# Patient Record
Sex: FEMALE | Race: WHITE | NOT HISPANIC OR LATINO | URBAN - METROPOLITAN AREA
[De-identification: names, ages, dates, MRNs, and addresses within clinical notes are randomized per-mention and may not be internally consistent; named-entity substitution may affect disease eponyms.]

---

## 2023-08-23 ENCOUNTER — OFFICE VISIT (OUTPATIENT)
Age: 23
End: 2023-08-23
Payer: COMMERCIAL

## 2023-08-23 VITALS
BODY MASS INDEX: 38.57 KG/M2 | WEIGHT: 240 LBS | RESPIRATION RATE: 18 BRPM | SYSTOLIC BLOOD PRESSURE: 144 MMHG | OXYGEN SATURATION: 97 % | HEART RATE: 98 BPM | TEMPERATURE: 98 F | HEIGHT: 66 IN | DIASTOLIC BLOOD PRESSURE: 101 MMHG

## 2023-08-23 DIAGNOSIS — B30.9 ACUTE VIRAL CONJUNCTIVITIS OF RIGHT EYE: Primary | ICD-10-CM

## 2023-08-23 DIAGNOSIS — R03.0 ELEVATED BLOOD PRESSURE READING IN OFFICE WITHOUT DIAGNOSIS OF HYPERTENSION: ICD-10-CM

## 2023-08-23 PROCEDURE — 99203 OFFICE O/P NEW LOW 30 MIN: CPT | Performed by: EMERGENCY MEDICINE

## 2023-08-23 RX ORDER — LORATADINE 10 MG/1
10 TABLET ORAL DAILY
COMMUNITY

## 2023-08-23 RX ORDER — POLYMYXIN B SULFATE AND TRIMETHOPRIM 1; 10000 MG/ML; [USP'U]/ML
1 SOLUTION OPHTHALMIC EVERY 4 HOURS
Qty: 10 ML | Refills: 0 | Status: SHIPPED | OUTPATIENT
Start: 2023-08-23

## 2023-08-23 NOTE — PROGRESS NOTES
[facility]        NAME: Amanda Calderon is a 26 y.o. female  : 2000    MRN: [id number]  DATE: 2023  TIME: 10:00 AM    Assessment and Plan   Acute viral conjunctivitis of right eye [B30.9]  1. Acute viral conjunctivitis of right eye  polymyxin b-trimethoprim (POLYTRIM) ophthalmic solution      2. Elevated blood pressure reading in office without diagnosis of hypertension              Patient Instructions     Patient Instructions     You have been diagnosed with conjunctivitis, which may be viral, bacterial, allergic or chemical and there is no test available in an urgent care setting can be used to tell the difference. Statistically 95% of the cases are viral.  The viruses that cause conjunctivitis often are the same viruses that cause viral upper respiratory infections. Although your conjunctivitis today is likely viral we do give antibiotic drops as a precaution. Avoid Visine or any similar vasoconstrictor drops as this will interfere with your body's ability to fight this infection. Use only the antibiotic drops as prescribed but you may also use cool compresses to the eyelids or an eye cup with cold saline to reduce redness or swelling. You may also take an anti-inflammatory like Advil or Aleve for the redness and swelling. Take Zinc 50 mg every 12 hours for the next week. You should also take vitamin D3 5000 i.u.s per day for the next 1 week, and vitamin-C 1 g daily. Conjunctivitis   AMBULATORY CARE:   Conjunctivitis , or pink eye, is inflammation of your conjunctiva. The conjunctiva is a thin tissue that covers the front of your eye and the back of your eyelids. The conjunctiva helps protect your eye and keep it moist. Conjunctivitis may be caused by bacteria, allergies, or a virus. If your conjunctivitis is caused by bacteria, it may get better on its own in about 7 days. Viral conjunctivitis can last up to 3 weeks. Common symptoms may include any of the following:   You will usually have symptoms in both eyes if your conjunctivitis is caused by allergies. You may also have other allergic symptoms, such as a rash or runny nose. Symptoms will usually start in 1 eye if your conjunctivitis is caused by a virus or bacteria. • Redness in the whites of your eye    • Itching in your eye or around your eye    • Feeling like there is something in your eye    • Watery or thick, sticky discharge    • Crusty eyelids when you wake up in the morning    • Burning, stinging, or swelling in your eye    • Pain when you see bright light    Seek care immediately if:   • You have worsening eye pain. • The swelling in your eye gets worse, even after treatment. • Your vision suddenly becomes worse or you cannot see at all. Call your doctor if:   • You develop a fever and ear pain. • You have tiny bumps or spots of blood on your eye. • You have questions or concerns about your condition or care. Treatment  will depend on the cause of your conjunctivitis. You may need antibiotics or allergy medicine as a pill, eye drop, or eye ointment. Manage your symptoms:   • Apply a cool compress. Wet a washcloth with cold water and place it on your eye. This will help decrease itching and irritation. • Do not wear contact lenses. They can irritate your eye. Throw away the pair you are using and ask when you can wear them again. Use a new pair of lenses when your provider says it is okay. • Avoid irritants. Stay away from smoke filled areas. Shield your eyes from wind and sun. • Flush your eye. You may need to flush your eye with saline to help decrease your symptoms. Ask for more information on how to flush your eye. Medicines:  Treatment depends on what is causing your conjunctivitis. You may be given any of the following:  • Allergy medicine  helps decrease itchy, red, swollen eyes caused by allergies. It may be given as a pill, eye drops, or nasal spray.     • Antibiotics  may be needed if your conjunctivitis is caused by bacteria. This medicine may be given as a pill, eye drops, or eye ointment. • Take your medicine as directed. Contact your healthcare provider if you think your medicine is not helping or if you have side effects. Tell your provider if you are allergic to any medicine. Keep a list of the medicines, vitamins, and herbs you take. Include the amounts, and when and why you take them. Bring the list or the pill bottles to follow-up visits. Carry your medicine list with you in case of an emergency. Prevent the spread of conjunctivitis:   • Wash your hands with soap and water often. Wash your hands before and after you touch your eyes. Also wash your hands before you prepare or eat food and after you use the bathroom or change a diaper. • Avoid allergens. Try to avoid the things that cause your allergies, such as pets, dust, or grass. • Avoid contact with others. Do not share towels or washcloths. Try to stay away from others as much as possible. Ask when you can return to work or school. • Throw away eye makeup. The bacteria that caused your conjunctivitis can stay in eye makeup. Throw away your current mascara and other eye makeup. Never share mascara or other eye makeup with anyone. Follow up with your doctor as directed:  Write down your questions so you remember to ask them during your visits. © Copyright Vanna [employer] 2022 Information is for End User's use only and may not be sold, redistributed or otherwise used for commercial purposes. The above information is an  only. It is not intended as medical advice for individual conditions or treatments. Talk to your doctor, nurse or pharmacist before following any medical regimen to see if it is safe and effective for you. Follow up with PCP in 3-5 days. Proceed to  ER if symptoms worsen.     Chief Complaint     Chief Complaint   Patient presents with   • Cold Like Symptoms Right eye drainage, stuffy nose and sore throat since Monday          History of Present Illness       Complains of pinkeye on the right since 2 days ago. Review of Systems   Review of Systems   Constitutional: Negative for chills and fever. HENT: Negative for congestion. Eyes: Positive for discharge, redness and itching. Negative for photophobia, pain and visual disturbance. Respiratory: Negative for cough. Current Medications       Current Outpatient Medications:   •  etonogestrel (NEXPLANON) subdermal implant, Inject under the skin, Disp: , Rfl:   •  loratadine (CLARITIN) 10 mg tablet, Take 10 mg by mouth daily, Disp: , Rfl:   •  polymyxin b-trimethoprim (POLYTRIM) ophthalmic solution, Administer 1 drop to the right eye every 4 (four) hours, Disp: 10 mL, Rfl: 0    Current Allergies     Allergies as of 08/23/2023 - Reviewed 08/23/2023   Allergen Reaction Noted   • Cat hair extract Hives 01/25/2023            The following portions of the patient's history were reviewed and updated as appropriate: allergies, current medications, past family history, past medical history, past social history, past surgical history and problem list.     History reviewed. No pertinent past medical history. History reviewed. No pertinent surgical history. Family History   Problem Relation Age of Onset   • No Known Problems Mother    • No Known Problems Father          Medications have been verified. Objective   BP (!) 144/101   Pulse 98   Temp 98 °F (36.7 °C) (Tympanic)   Resp 18   Ht 5' 6" (1.676 m)   Wt 109 kg (240 lb)   SpO2 97%   BMI 38.74 kg/m²        Physical Exam     Physical Exam  Vitals and nursing note reviewed. Constitutional:       General: She is not in acute distress. Appearance: She is well-developed. Eyes:      General: No scleral icterus. Right eye: Discharge present. Left eye: No discharge. Pupils: Pupils are equal, round, and reactive to light. Cardiovascular:      Rate and Rhythm: Normal rate and regular rhythm. Pulmonary:      Effort: Pulmonary effort is normal.      Breath sounds: Normal breath sounds.

## 2023-08-23 NOTE — PATIENT INSTRUCTIONS
You have been diagnosed with conjunctivitis, which may be viral, bacterial, allergic or chemical and there is no test available in an urgent care setting can be used to tell the difference. Statistically 95% of the cases are viral.  The viruses that cause conjunctivitis often are the same viruses that cause viral upper respiratory infections. Although your conjunctivitis today is likely viral we do give antibiotic drops as a precaution. Avoid Visine or any similar vasoconstrictor drops as this will interfere with your body's ability to fight this infection. Use only the antibiotic drops as prescribed but you may also use cool compresses to the eyelids or an eye cup with cold saline to reduce redness or swelling. You may also take an anti-inflammatory like Advil or Aleve for the redness and swelling. Take Zinc 50 mg every 12 hours for the next week. You should also take vitamin D3 5000 i.u.s per day for the next 1 week, and vitamin-C 1 g daily. Conjunctivitis   AMBULATORY CARE:   Conjunctivitis , or pink eye, is inflammation of your conjunctiva. The conjunctiva is a thin tissue that covers the front of your eye and the back of your eyelids. The conjunctiva helps protect your eye and keep it moist. Conjunctivitis may be caused by bacteria, allergies, or a virus. If your conjunctivitis is caused by bacteria, it may get better on its own in about 7 days. Viral conjunctivitis can last up to 3 weeks. Common symptoms may include any of the following: You will usually have symptoms in both eyes if your conjunctivitis is caused by allergies. You may also have other allergic symptoms, such as a rash or runny nose. Symptoms will usually start in 1 eye if your conjunctivitis is caused by a virus or bacteria.   Redness in the whites of your eye    Itching in your eye or around your eye    Feeling like there is something in your eye    Watery or thick, sticky discharge    Crusty eyelids when you wake up in the morning    Burning, stinging, or swelling in your eye    Pain when you see bright light    Seek care immediately if:   You have worsening eye pain. The swelling in your eye gets worse, even after treatment. Your vision suddenly becomes worse or you cannot see at all. Call your doctor if:   You develop a fever and ear pain. You have tiny bumps or spots of blood on your eye. You have questions or concerns about your condition or care. Treatment  will depend on the cause of your conjunctivitis. You may need antibiotics or allergy medicine as a pill, eye drop, or eye ointment. Manage your symptoms:   Apply a cool compress. Wet a washcloth with cold water and place it on your eye. This will help decrease itching and irritation. Do not wear contact lenses. They can irritate your eye. Throw away the pair you are using and ask when you can wear them again. Use a new pair of lenses when your provider says it is okay. Avoid irritants. Stay away from smoke filled areas. Shield your eyes from wind and sun. Flush your eye. You may need to flush your eye with saline to help decrease your symptoms. Ask for more information on how to flush your eye. Medicines:  Treatment depends on what is causing your conjunctivitis. You may be given any of the following: Allergy medicine  helps decrease itchy, red, swollen eyes caused by allergies. It may be given as a pill, eye drops, or nasal spray. Antibiotics  may be needed if your conjunctivitis is caused by bacteria. This medicine may be given as a pill, eye drops, or eye ointment. Take your medicine as directed. Contact your healthcare provider if you think your medicine is not helping or if you have side effects. Tell your provider if you are allergic to any medicine. Keep a list of the medicines, vitamins, and herbs you take. Include the amounts, and when and why you take them. Bring the list or the pill bottles to follow-up visits.  Carry your medicine list with you in case of an emergency. Prevent the spread of conjunctivitis:   Wash your hands with soap and water often. Wash your hands before and after you touch your eyes. Also wash your hands before you prepare or eat food and after you use the bathroom or change a diaper. Avoid allergens. Try to avoid the things that cause your allergies, such as pets, dust, or grass. Avoid contact with others. Do not share towels or washcloths. Try to stay away from others as much as possible. Ask when you can return to work or school. Throw away eye makeup. The bacteria that caused your conjunctivitis can stay in eye makeup. Throw away your current mascara and other eye makeup. Never share mascara or other eye makeup with anyone. Follow up with your doctor as directed:  Write down your questions so you remember to ask them during your visits. © Copyright [facility] 2022 Information is for End User's use only and may not be sold, redistributed or otherwise used for commercial purposes. The above information is an  only. It is not intended as medical advice for individual conditions or treatments. Talk to your doctor, nurse or pharmacist before following any medical regimen to see if it is safe and effective for you. Prehypertension (Elevated Blood Pressure)   WHAT YOU NEED TO KNOW:   What is prehypertension? Prehypertension is a blood pressure level that is elevated, or slightly higher than normal. Blood pressure is the force of your blood pressing against the walls of your arteries. Normal blood pressure is 119/79 or lower. Prehypertension is 120/80 to 129/80. Prehypertension increases your risk for chronic (long-term) high blood pressure. Prehypertension and high blood pressure increase your risk for heart and blood vessel disease. Over time, this increases your risk for a life-threatening heart attack, stroke, heart failure, or kidney disease.        What increases my risk for prehypertension? Obesity or lack of exercise    Eating too much sodium (salt)    Low intake of fruits, vegetables, and other foods high in potassium    Use of tobacco, alcohol, or illegal drugs    A medical condition, such as diabetes, high cholesterol, or sleep apnea    Medicines, such as steroids or birth control pills    A family history of hypertension or heart disease    Age older than 54 years (men)    Age older than 72 years (women)    How is prehypertension diagnosed? Your healthcare provider will ask if you have a family history of high blood pressure. He or she will also ask about the medicines you take and any health conditions you have. He or she will check your blood pressure using a blood pressure cuff. Your healthcare provider may take more than one blood pressure reading, and take the average of these readings. How can I manage prehypertension? The goal is to lower your blood pressure into the normal range. Talk to your healthcare provider about these and other lifestyle changes you may need to make. Lifestyle changes can help prevent the need for medicine to lower your blood pressure. Check your blood pressure as directed. Your healthcare provider will tell you how often to check your blood pressure. Regular checks can help you monitor your blood pressure levels and make lifestyle changes if needed. Your provider may want you to keep a record of your blood pressure readings. Bring the record with you to follow-up appointments. Eat less sodium (salt). Do not add sodium to your food. Limit foods that are high in sodium, such as canned foods, potato chips, and cold cuts. Follow the meal plan recommended by your healthcare provider. Your healthcare provider may suggest that you follow the [address] This eating plan is low in sodium, unhealthy fats, and total fat. It is high in potassium, calcium, and fiber.          Exercise to maintain or reach a healthy weight. Exercise at least 30 minutes per day, on most days of the week. This will help decrease your blood pressure. Ask your healthcare provider about the best exercise plan for you. Decrease stress. This may help lower your blood pressure. Learn ways to relax, such as deep breathing or listening to music. Limit alcohol. Ask your healthcare provider if it is safe for you to drink alcohol. Women should limit alcohol to 1 drink a day. Men should limit alcohol to 2 drinks a day. A drink of alcohol is 12 ounces of beer, 5 ounces of wine, or 1½ ounces of liquor. Do not smoke. Nicotine and other chemicals in cigarettes and cigars can increase your blood pressure and cause lung damage. Ask your healthcare provider for information if you currently smoke and need help to quit. E-cigarettes or smokeless tobacco still contain nicotine. Talk to your healthcare provider before you use these products. Blood pressure medicine  may be needed if you cannot lower your blood pressure. Manage any other health conditions you have. Go to regular follow-up visits with your healthcare provider to manage these conditions. Call your local emergency number (911 in the [address]) if:   You have any of the following signs of a heart attack:      Squeezing, pressure, or pain in your chest    You may  also have any of the following:     Discomfort or pain in your back, neck, jaw, stomach, or arm    Shortness of breath    Nausea or vomiting    Lightheadedness or a sudden cold sweat    You have any of the following signs of a stroke:      Numbness or drooping on one side of your face     Weakness in an arm or leg    Confusion or difficulty speaking    Dizziness, a severe headache, or vision loss    When should I seek immediate care? You have a severe headache. You have sudden vision changes. When should I call my doctor?    You have been taking blood pressure medicine and your blood pressure is still higher than your healthcare provider says it should be. You have questions or concerns about your condition or care. CARE AGREEMENT:   You have the right to help plan your care. Learn about your health condition and how it may be treated. Discuss treatment options with your healthcare providers to decide what care you want to receive. You always have the right to refuse treatment. The above information is an  only. It is not intended as medical advice for individual conditions or treatments. Talk to your doctor, nurse or pharmacist before following any medical regimen to see if it is safe and effective for you. © Copyright [employer]et[employer] 2022 Information is for End User's use only and may not be sold, redistributed or otherwise used for commercial purposes.

## 2023-12-22 ENCOUNTER — OFFICE VISIT (OUTPATIENT)
Age: 23
End: 2023-12-22
Payer: COMMERCIAL

## 2023-12-22 VITALS
BODY MASS INDEX: 38.57 KG/M2 | TEMPERATURE: 96.8 F | HEART RATE: 98 BPM | OXYGEN SATURATION: 97 % | DIASTOLIC BLOOD PRESSURE: 88 MMHG | SYSTOLIC BLOOD PRESSURE: 132 MMHG | WEIGHT: 240 LBS | HEIGHT: 66 IN | RESPIRATION RATE: 17 BRPM

## 2023-12-22 DIAGNOSIS — U07.1 COVID-19: Primary | ICD-10-CM

## 2023-12-22 LAB
SARS-COV-2 AG UPPER RESP QL IA: POSITIVE
VALID CONTROL: ABNORMAL

## 2023-12-22 PROCEDURE — 87811 SARS-COV-2 COVID19 W/OPTIC: CPT | Performed by: EMERGENCY MEDICINE

## 2023-12-22 PROCEDURE — 99213 OFFICE O/P EST LOW 20 MIN: CPT | Performed by: EMERGENCY MEDICINE

## 2023-12-22 RX ORDER — PREDNISONE 10 MG/1
TABLET ORAL
Qty: 27 TABLET | Refills: 0 | Status: SHIPPED | OUTPATIENT
Start: 2023-12-22

## 2023-12-22 RX ORDER — BENZONATATE 200 MG/1
200 CAPSULE ORAL
Qty: 12 CAPSULE | Refills: 0 | Status: SHIPPED | OUTPATIENT
Start: 2023-12-22

## 2023-12-22 NOTE — PATIENT INSTRUCTIONS
You have been diagnosed with COVID-19, and your symptoms should resolve over the next 7 to 10 days with the treatments recommended today.  If they do not, it is possible that you have developed a bacterial infection and you should return. If you were to take an antibiotic while you are still in the viral stage, you will not get better any faster, but could kill off good germs in your body as well as make germs resistant to the antibiotic.  Take an expectorant - guaifenesin should be the only ingredient - during the day, and the cough suppressant (ex. Robitussin DM or Tessalon) if needed at night only.   Take Zinc 50 mg every 12 hours for the next week (the dose is important so do not just take a multivitamin with zinc or an over-the-counter cold med with zinc such as Airborne or Zicam, as that will not give you the sufficient dose).  You should also take Quercetin 500 mg twice daily with it (again dose is important).  You should also take vitamin D3 5000 i.u.s per day for the next 1 week, and vitamin-C 1 g twice daily (and again dosages are important, do not think you are getting enough vitamin C just by drinking extra orange juice).  May take Flonase as discussed.  You may also take a decongestant like Sudafed, unless you have hypertension or cardiac disease.  You may take Imodium for diarrhea according to package instructions.         COVID-19    COVID-19 Home Care Guidelines     Your healthcare provider and/or public health staff have evaluated you and have determined that you do not need to be hospitalized at this time.  At this time you can be isolated at home where you will be monitored by staff from your local or state health department. You should carefully follow the prevention and isolation steps below until a healthcare provider or local or state health department says that you can return to your normal activities.        Stay home except to get medical care     People who are mildly ill with COVID-19 are  able to isolate at home during their illness. You should restrict activities outside your home, except for getting medical care. Do not go to work, school, or public areas. Avoid using public transportation, ride-sharing, or taxis.     Separate yourself from other people and animals in your home     People: As much as possible, you should stay in a specific room and away from other people in your home. Also, you should use a separate bathroom, if available.  Animals: You should restrict contact with pets and other animals while you are sick with COVID-19, just like you would around other people. Although there have not been reports of pets or other animals becoming sick with COVID-19, it is still recommended that people sick with COVID-19 limit contact with animals until more information is known about the virus. When possible, have another member of your household care for your animals while you are sick. If you are sick with COVID-19, avoid contact with your pet, including petting, snuggling, being kissed or licked, and sharing food. If you must care for your pet or be around animals while you are sick, wash your hands before and after you interact with pets and wear a facemask. See COVID-19 and Animals for more information.     Call ahead before visiting your doctor     If you have a medical appointment, call the healthcare provider and tell them that you have or may have COVID-19. This will help the healthcare provider's office take steps to keep other people from getting infected or exposed.     Wear a facemask     You should wear a facemask when you are around other people (e.g., sharing a room or vehicle) or pets and before you enter a healthcare provider's office. If you are not able to wear a facemask (for example, because it causes trouble breathing), then people who live with you should not stay in the same room with you, or they should wear a facemask if they enter your room.     Cover your coughs and  sneezes     Cover your mouth and nose with a tissue when you cough or sneeze. Throw used tissues in a lined trash can. Immediately wash your hands with soap and water for at least 20 seconds or, if soap and water are not available, clean your hands with an alcohol-based hand  that contains at least 60% alcohol.     Clean your hands often     Wash your hands often with soap and water for at least 20 seconds, especially after blowing your nose, coughing, or sneezing; going to the bathroom; and before eating or preparing food. If soap and water are not readily available, use an alcohol-based hand  with at least 60% alcohol, covering all surfaces of your hands and rubbing them together until they feel dry.  Soap and water are the best option if hands are visibly dirty. Avoid touching your eyes, nose, and mouth with unwashed hands.     Avoid sharing personal household items     You should not share dishes, drinking glasses, cups, eating utensils, towels, or bedding with other people or pets in your home. After using these items, they should be washed thoroughly with soap and water.     Clean all “high-touch” surfaces everyday     High touch surfaces include counters, tabletops, doorknobs, bathroom fixtures, toilets, phones, keyboards, tablets, and bedside tables. Also, clean any surfaces that may have blood, stool, or body fluids on them. Use a household cleaning spray or wipe, according to the label instructions. Labels contain instructions for safe and effective use of the cleaning product including precautions you should take when applying the product, such as wearing gloves and making sure you have good ventilation during use of the product.     Monitor your symptoms     Seek prompt medical attention if your illness is worsening (e.g., difficulty breathing). Before seeking care, call your healthcare provider and tell them that you have, or are being evaluated for, COVID-19. Put on a facemask before  you enter the facility. These steps will help the healthcare provider's office to keep other people in the office or waiting room from getting infected or exposed. Ask your healthcare provider to call the local or state health department. Persons who are placed under active monitoring or facilitated self-monitoring should follow instructions provided by their local health department or occupational health professionals, as appropriate.  If you have a medical emergency and need to call 911, notify the dispatch personnel that you have, or are being evaluated for COVID-19. If possible, put on a facemask before emergency medical services arrive.     Discontinuing home isolation     Patients with confirmed COVID-19 should remain under home isolation precautions until the risk of secondary transmission to others is thought to be low. The decision to discontinue home isolation precautions should be made on a case-by-case basis, in consultation with healthcare providers and state and local health departments.     Source: https://www.cdc.gov/coronavirus/2019-ncov/hcp/guidance-prevent-spread.html        Proceed to ER if symptoms worsen

## 2023-12-22 NOTE — PROGRESS NOTES
[facility]        NAME: Tere Tello is a 23 y.o. female  : 2000    MRN: [id number]  DATE: 2023  TIME: 9:46 AM    Assessment and Plan   COVID-19 [U07.1]  1. COVID-19  Poct Covid 19 Rapid Antigen Test    predniSONE 10 mg tablet    benzonatate (TESSALON) 200 MG capsule            Patient Instructions     Patient Instructions   You have been diagnosed with COVID-19, and your symptoms should resolve over the next 7 to 10 days with the treatments recommended today.  If they do not, it is possible that you have developed a bacterial infection and you should return. If you were to take an antibiotic while you are still in the viral stage, you will not get better any faster, but could kill off good germs in your body as well as make germs resistant to the antibiotic.  Take an expectorant - guaifenesin should be the only ingredient - during the day, and the cough suppressant (ex. Robitussin DM or Tessalon) if needed at night only.   Take Zinc 50 mg every 12 hours for the next week (the dose is important so do not just take a multivitamin with zinc or an over-the-counter cold med with zinc such as Airborne or Zicam, as that will not give you the sufficient dose).  You should also take Quercetin 500 mg twice daily with it (again dose is important).  You should also take vitamin D3 5000 i.u.s per day for the next 1 week, and vitamin-C 1 g twice daily (and again dosages are important, do not think you are getting enough vitamin C just by drinking extra orange juice).  May take Flonase as discussed.  You may also take a decongestant like Sudafed, unless you have hypertension or cardiac disease.  You may take Imodium for diarrhea according to package instructions.         COVID-19    COVID-19 Home Care Guidelines     Your healthcare provider and/or public health staff have evaluated you and have determined that you do not need to be hospitalized at this time.  At this time you can be isolated at home  where you will be monitored by staff from your local or state health department. You should carefully follow the prevention and isolation steps below until a healthcare provider or local or state health department says that you can return to your normal activities.        Stay home except to get medical care     People who are mildly ill with COVID-19 are able to isolate at home during their illness. You should restrict activities outside your home, except for getting medical care. Do not go to work, school, or public areas. Avoid using public transportation, ride-sharing, or taxis.     Separate yourself from other people and animals in your home     People: As much as possible, you should stay in a specific room and away from other people in your home. Also, you should use a separate bathroom, if available.  Animals: You should restrict contact with pets and other animals while you are sick with COVID-19, just like you would around other people. Although there have not been reports of pets or other animals becoming sick with COVID-19, it is still recommended that people sick with COVID-19 limit contact with animals until more information is known about the virus. When possible, have another member of your household care for your animals while you are sick. If you are sick with COVID-19, avoid contact with your pet, including petting, snuggling, being kissed or licked, and sharing food. If you must care for your pet or be around animals while you are sick, wash your hands before and after you interact with pets and wear a facemask. See COVID-19 and Animals for more information.     Call ahead before visiting your doctor     If you have a medical appointment, call the healthcare provider and tell them that you have or may have COVID-19. This will help the healthcare provider's office take steps to keep other people from getting infected or exposed.     Wear a facemask     You should wear a facemask when you are  around other people (e.g., sharing a room or vehicle) or pets and before you enter a healthcare provider's office. If you are not able to wear a facemask (for example, because it causes trouble breathing), then people who live with you should not stay in the same room with you, or they should wear a facemask if they enter your room.     Cover your coughs and sneezes     Cover your mouth and nose with a tissue when you cough or sneeze. Throw used tissues in a lined trash can. Immediately wash your hands with soap and water for at least 20 seconds or, if soap and water are not available, clean your hands with an alcohol-based hand  that contains at least 60% alcohol.     Clean your hands often     Wash your hands often with soap and water for at least 20 seconds, especially after blowing your nose, coughing, or sneezing; going to the bathroom; and before eating or preparing food. If soap and water are not readily available, use an alcohol-based hand  with at least 60% alcohol, covering all surfaces of your hands and rubbing them together until they feel dry.  Soap and water are the best option if hands are visibly dirty. Avoid touching your eyes, nose, and mouth with unwashed hands.     Avoid sharing personal household items     You should not share dishes, drinking glasses, cups, eating utensils, towels, or bedding with other people or pets in your home. After using these items, they should be washed thoroughly with soap and water.     Clean all “high-touch” surfaces everyday     High touch surfaces include counters, tabletops, doorknobs, bathroom fixtures, toilets, phones, keyboards, tablets, and bedside tables. Also, clean any surfaces that may have blood, stool, or body fluids on them. Use a household cleaning spray or wipe, according to the label instructions. Labels contain instructions for safe and effective use of the cleaning product including precautions you should take when applying the  product, such as wearing gloves and making sure you have good ventilation during use of the product.     Monitor your symptoms     Seek prompt medical attention if your illness is worsening (e.g., difficulty breathing). Before seeking care, call your healthcare provider and tell them that you have, or are being evaluated for, COVID-19. Put on a facemask before you enter the facility. These steps will help the healthcare provider's office to keep other people in the office or waiting room from getting infected or exposed. Ask your healthcare provider to call the local or state health department. Persons who are placed under active monitoring or facilitated self-monitoring should follow instructions provided by their local health department or occupational health professionals, as appropriate.  If you have a medical emergency and need to call 911, notify the dispatch personnel that you have, or are being evaluated for COVID-19. If possible, put on a facemask before emergency medical services arrive.     Discontinuing home isolation     Patients with confirmed COVID-19 should remain under home isolation precautions until the risk of secondary transmission to others is thought to be low. The decision to discontinue home isolation precautions should be made on a case-by-case basis, in consultation with healthcare providers and [facility] and local health departments.     Source: https://www.cdc.gov/coronavirus/2019-ncov/hcp/guidance-prevent-spread.html        Proceed to ER if symptoms worsen          Follow up with PCP in 3-5 days.  Proceed to  ER if symptoms worsen.    Chief Complaint     Chief Complaint   Patient presents with    Cold Like Symptoms     Started x2 days ago - Productive cough w/ green mucus, irritated throat, nasal congestion. OTC - tylenol cold and flu. No home COVID test.          History of Present Illness       Patient complains of sore throat, cough, congestion for the past 2 days.        Review of  Systems   Review of Systems   Constitutional:  Negative for appetite change, chills, fatigue and fever.   HENT:  Positive for congestion, rhinorrhea, sinus pressure and sore throat. Negative for trouble swallowing and voice change.    Respiratory:  Positive for cough. Negative for chest tightness, shortness of breath and wheezing.    Cardiovascular:  Negative for chest pain.   Gastrointestinal:  Negative for nausea.   Musculoskeletal:  Negative for myalgias.         Current Medications       Current Outpatient Medications:     benzonatate (TESSALON) 200 MG capsule, Take 1 capsule (200 mg total) by mouth daily at bedtime as needed for cough, Disp: 12 capsule, Rfl: 0    etonogestrel (NEXPLANON) subdermal implant, Inject under the skin, Disp: , Rfl:     loratadine (CLARITIN) 10 mg tablet, Take 10 mg by mouth daily, Disp: , Rfl:     predniSONE 10 mg tablet, Take once daily all days pills on this schedule 6- 6- 5- 4- 3- 2- 1, Disp: 27 tablet, Rfl: 0    polymyxin b-trimethoprim (POLYTRIM) ophthalmic solution, Administer 1 drop to the right eye every 4 (four) hours (Patient not taking: Reported on 12/22/2023), Disp: 10 mL, Rfl: 0    Current Allergies     Allergies as of 12/22/2023 - Reviewed 12/22/2023   Allergen Reaction Noted    Cat hair extract Hives 01/25/2023            The following portions of the patient's history were reviewed and updated as appropriate: allergies, current medications, past family history, past medical history, past social history, past surgical history and problem list.     History reviewed. No pertinent past medical history.    History reviewed. No pertinent surgical history.    Family History   Problem Relation Age of Onset    No Known Problems Mother     No Known Problems Father          Medications have been verified.        Objective   /88 (BP Location: Right arm, Patient Position: Sitting, Cuff Size: Extra-Large)   Pulse 98   Temp (!) 96.8 °F (36 °C) (Tympanic)   Resp 17   Ht 5'  "6\" (1.676 m)   Wt 109 kg (240 lb)   LMP 12/18/2023   SpO2 97%   BMI 38.74 kg/m²        Physical Exam     Physical Exam  Vitals and nursing note reviewed.   Constitutional:       General: She is not in acute distress.     Appearance: She is well-developed. She is not ill-appearing or toxic-appearing.   HENT:      Head: Normocephalic and atraumatic.      Right Ear: External ear normal.      Left Ear: External ear normal.      Nose: Mucosal edema and congestion present.      Mouth/Throat:      Pharynx: Posterior oropharyngeal erythema present. No oropharyngeal exudate.      Tonsils: No tonsillar abscesses.   Cardiovascular:      Rate and Rhythm: Normal rate and regular rhythm.   Pulmonary:      Effort: Pulmonary effort is normal. No respiratory distress.      Breath sounds: No wheezing or rales.   Musculoskeletal:      Cervical back: Neck supple.   Skin:     General: Skin is warm and dry.   Neurological:      Mental Status: She is alert and oriented to person, place, and time.   Psychiatric:         Mood and Affect: Mood normal.         Behavior: Behavior normal.         Thought Content: Thought content normal.         Judgment: Judgment normal.                   "

## 2024-02-05 ENCOUNTER — OFFICE VISIT (OUTPATIENT)
Age: 24
End: 2024-02-05
Payer: COMMERCIAL

## 2024-02-05 VITALS
WEIGHT: 293 LBS | DIASTOLIC BLOOD PRESSURE: 86 MMHG | TEMPERATURE: 97 F | SYSTOLIC BLOOD PRESSURE: 122 MMHG | BODY MASS INDEX: 47.09 KG/M2 | RESPIRATION RATE: 18 BRPM | OXYGEN SATURATION: 99 % | HEIGHT: 66 IN | HEART RATE: 92 BPM

## 2024-02-05 DIAGNOSIS — J06.9 VIRAL URI: Primary | ICD-10-CM

## 2024-02-05 PROCEDURE — 99213 OFFICE O/P EST LOW 20 MIN: CPT | Performed by: EMERGENCY MEDICINE

## 2024-02-05 RX ORDER — BENZONATATE 200 MG/1
200 CAPSULE ORAL
Qty: 12 CAPSULE | Refills: 0 | Status: SHIPPED | OUTPATIENT
Start: 2024-02-05

## 2024-02-05 RX ORDER — PREDNISONE 10 MG/1
TABLET ORAL
Qty: 27 TABLET | Refills: 0 | Status: SHIPPED | OUTPATIENT
Start: 2024-02-05

## 2024-02-05 NOTE — PROGRESS NOTES
[facility]        NAME: Tere Tello is a 23 y.o. female  : 2000    MRN: [id number]  DATE: 2024  TIME: 8:59 AM    Assessment and Plan   Viral URI [J06.9]  1. Viral URI  predniSONE 10 mg tablet    benzonatate (TESSALON) 200 MG capsule            Patient Instructions     Patient Instructions   You have been diagnosed with a Viral Upper Respiratory infection and your symptoms should resolve over the next 7 to 10 days with the treatments recommended today.  If they do not, it is possible that you have developed a bacterial infection and you should return. If you were to take an antibiotic while you are still in the viral stage, you will not get better any faster, but could kill off the good germs in your body as well as make the germs in you resistant to the antibiotic.  Take an expectorant - guaifenesin should be the only ingredient - during the day, and the cough suppressant (ex. Robitussin DM or Tessalon) if needed at night only.   Take Zinc 25 mg every 12 hours for the next week (the dose is important so do not just take a multivitamin with zinc or an over-the-counter cold med with zinc such as Airborne or Zicam, as that will not give you the sufficient dose).    You should also take Vitamin D3 5000 i.u.s per day for the next 1 week, and Vitamin-C 1 g twice daily (and again dosages are important, do not think you are getting enough vitamin C just by drinking extra orange juice).  You may use Flonase as discussed.  You may also take a decongestant like Sudafed, unless you have hypertension or cardiac disease. Avoid nasal sprays like Afrin or other vasoconstrictors.  If you are diabetic you should adhere strictly to your diabetic diet and monitor blood sugar closely while on prednisone and you should discontinue the prednisone if blood sugar becomes significantly elevated.  Avoid nonsteroidal anti-inflammatories like Advil or Aleve while on prednisone.     Upper Respiratory Infection    AMBULATORY CARE:   An upper respiratory infection  is also called a cold. Your nose, throat, ears, and sinuses may be affected. You are more likely to get a cold in the winter. Your risk of getting a cold may be increased if you smoke cigarettes or have allergies, such as hay fever.  What causes a cold?  A cold is caused by a virus. Many viruses can cause a cold, and each is contagious. This means the virus can be easily spread to another person when the sick person coughs or sneezes. The virus can also be spread if you touch an object the virus is on and then touch your eyes, mouth, or nose.  Cold symptoms  are usually worst for the first 3 to 5 days. You may have any of the following:  Runny or stuffy nose    Sneezing and coughing    Sore throat or hoarseness    Red, watery, and sore eyes    Fatigue (you feel more tired than usual)    Chills and fever    Headache, body aches, or sore muscles    Call your local emergency number (911 in the ) if:   You have chest pain or trouble breathing.      Seek care immediately if:   You have a fever over 102ºF (39ºC).      Call your doctor if:   You have a low fever.    Your sore throat gets worse or you see white or yellow spots in your throat.    Your symptoms get worse after 3 to 5 days or are not better in 14 days.    You have a rash anywhere on your skin.    You have large, tender lumps in your neck.    You have thick, green, or yellow drainage from your nose.    You cough up thick yellow, green, or bloody mucus.    You have a bad earache.    You have questions or concerns about your condition or care.    Treatment:  Colds are caused by viruses and do not get better with antibiotics. Most people get better in 7 to 14 days. You may continue to cough for 2 to 3 weeks. The following may help decrease your symptoms:  Decongestants  help reduce nasal congestion and help you breathe more easily. If you take decongestant pills, they may make you feel restless or not able to  sleep. Do not use decongestant sprays for more than a few days.    Cough suppressants  help reduce coughing. Ask your healthcare provider which type of cough medicine is best for you.     NSAIDs , such as ibuprofen, help decrease swelling, pain, and fever. NSAIDs can cause stomach bleeding or kidney problems in certain people. If you take blood thinner medicine, always ask your healthcare provider if NSAIDs are safe for you. Always read the medicine label and follow directions.    Acetaminophen  decreases pain and fever. It is available without a doctor's order. Ask how much to take and how often to take it. Follow directions. Read the labels of all other medicines you are using to see if they also contain acetaminophen, or ask your doctor or pharmacist. Acetaminophen can cause liver damage if not taken correctly. Do not use more than 4 grams (4,000 milligrams) total of acetaminophen in one day.    Manage a cold:   Rest as much as possible.  Slowly start to do more each day.    Drink more liquids as directed.  Liquids will help thin and loosen mucus so you can cough it up. Liquids will also help prevent dehydration. Liquids that help prevent dehydration include water, fruit juice, and broth. Do not drink liquids that contain caffeine. Caffeine can increase your risk for dehydration. Ask your healthcare provider how much liquid to drink each day.    Soothe a sore throat.  Gargle with warm salt water. Make salt water by dissolving ¼ teaspoon salt in 1 cup warm water. You may also suck on hard candy or throat lozenges. You may use a sore throat spray.    Use a humidifier or vaporizer.  Use a cool mist humidifier or a vaporizer to increase air moisture in your home. This may make it easier for you to breathe and help decrease your cough.    Use saline nasal drops as directed.  These help relieve congestion.    Apply petroleum-based jelly around the outside of your nostrils.  This can decrease irritation from blowing  your nose.    Do not smoke.  Nicotine and other chemicals in cigarettes and cigars can make your symptoms worse. They can also cause infections such as bronchitis or pneumonia. Ask your healthcare provider for information if you currently smoke and need help to quit. E-cigarettes or smokeless tobacco still contain nicotine. Talk to your healthcare provider before you use these products.    Prevent a cold:   Wash your hands often.  Use soap and water every time you wash your hands. Rub your soapy hands together, lacing your fingers. Use the fingers of one hand to scrub under the nails of the other hand. Wash for at least 20 seconds. Rinse with warm, running water for several seconds. Then dry your hands. Use germ-killing gel if soap and water are not available. Do not touch your eyes or mouth without washing your hands first.     Cover a sneeze or cough.  Use a tissue that covers your mouth and nose. Put the used tissue in the trash right away. Use the bend of your arm if a tissue is not available. Wash your hands well with soap and water or use a hand . Do not stand close to anyone who is sneezing or coughing.    Try to stay away from others while you are sick.  This is especially important during the first 2 to 3 days when the virus is more easily spread. Wait until a fever, cough, or other symptoms are gone before you return to work or other regular activities.    Do not share items while you are sick.  This includes food, drinks, eating utensils, and dishes.    Follow up with your doctor as directed:  Write down your questions so you remember to ask them during your visits.  © Copyright [employer] 2022 Information is for End User's use only and may not be sold, redistributed or otherwise used for commercial purposes. All illustrations and images included in CareNotes® are the copyrighted property of [employer]. or [employer]  The above information is an  only. It is not  intended as medical advice for individual conditions or treatments. Talk to your doctor, nurse or pharmacist before following any medical regimen to see if it is safe and effective for you.        Follow up with PCP in 3-5 days.  Proceed to  ER if symptoms worsen.    Chief Complaint     Chief Complaint   Patient presents with    Cold Like Symptoms     Sinus congestion since Thursday with post nasal drip and was taking Dayquil. Sore throat since last night and persists this morning. Occasional cough, no body aches, no fever.          History of Present Illness       Patient complains of sore throat, cough, congestion for the past 4 days.        Review of Systems   Review of Systems   Constitutional:  Negative for appetite change, chills, fatigue and fever.   HENT:  Positive for congestion, rhinorrhea, sinus pressure and sore throat. Negative for trouble swallowing and voice change.    Respiratory:  Positive for cough. Negative for chest tightness, shortness of breath and wheezing.    Cardiovascular:  Negative for chest pain.   Gastrointestinal:  Negative for nausea.   Musculoskeletal:  Negative for myalgias.         Current Medications       Current Outpatient Medications:     benzonatate (TESSALON) 200 MG capsule, Take 1 capsule (200 mg total) by mouth daily at bedtime as needed for cough, Disp: 12 capsule, Rfl: 0    etonogestrel (NEXPLANON) subdermal implant, Inject under the skin, Disp: , Rfl:     loratadine (CLARITIN) 10 mg tablet, Take 10 mg by mouth daily, Disp: , Rfl:     predniSONE 10 mg tablet, Take once daily all days pills on this schedule 6- 6- 5- 4- 3- 2- 1, Disp: 27 tablet, Rfl: 0    benzonatate (TESSALON) 200 MG capsule, Take 1 capsule (200 mg total) by mouth daily at bedtime as needed for cough (Patient not taking: Reported on 2/5/2024), Disp: 12 capsule, Rfl: 0    polymyxin b-trimethoprim (POLYTRIM) ophthalmic solution, Administer 1 drop to the right eye every 4 (four) hours (Patient not taking:  "Reported on 12/22/2023), Disp: 10 mL, Rfl: 0    predniSONE 10 mg tablet, Take once daily all days pills on this schedule 6- 6- 5- 4- 3- 2- 1 (Patient not taking: Reported on 2/5/2024), Disp: 27 tablet, Rfl: 0    Current Allergies     Allergies as of 02/05/2024 - Reviewed 02/05/2024   Allergen Reaction Noted    Cat hair extract Hives 01/25/2023            The following portions of the patient's history were reviewed and updated as appropriate: allergies, current medications, past family history, past medical history, past social history, past surgical history and problem list.     History reviewed. No pertinent past medical history.    History reviewed. No pertinent surgical history.    Family History   Problem Relation Age of Onset    No Known Problems Mother     No Known Problems Father          Medications have been verified.        Objective   /86   Pulse 92   Temp (!) 97 °F (36.1 °C)   Resp 18   Ht 5' 6\" (1.676 m)   Wt 136 kg (298 lb 15.1 oz)   LMP  (LMP Unknown)   SpO2 99%   BMI 48.25 kg/m²        Physical Exam     Physical Exam  Vitals and nursing note reviewed.   Constitutional:       General: She is not in acute distress.     Appearance: She is well-developed. She is not ill-appearing or toxic-appearing.   HENT:      Head: Normocephalic and atraumatic.      Right Ear: External ear normal.      Left Ear: External ear normal.      Nose: Mucosal edema and congestion present.      Mouth/Throat:      Pharynx: Posterior oropharyngeal erythema present. No oropharyngeal exudate.      Tonsils: No tonsillar abscesses.   Cardiovascular:      Rate and Rhythm: Normal rate and regular rhythm.   Pulmonary:      Effort: Pulmonary effort is normal. No respiratory distress.      Breath sounds: No wheezing or rales.   Musculoskeletal:      Cervical back: Neck supple.   Skin:     General: Skin is warm and dry.   Neurological:      Mental Status: She is alert and oriented to person, place, and time.   Psychiatric:  "        Mood and Affect: Mood normal.         Behavior: Behavior normal.         Thought Content: Thought content normal.         Judgment: Judgment normal.

## 2024-02-05 NOTE — PATIENT INSTRUCTIONS
You have been diagnosed with a Viral Upper Respiratory infection and your symptoms should resolve over the next 7 to 10 days with the treatments recommended today.  If they do not, it is possible that you have developed a bacterial infection and you should return. If you were to take an antibiotic while you are still in the viral stage, you will not get better any faster, but could kill off the good germs in your body as well as make the germs in you resistant to the antibiotic.  Take an expectorant - guaifenesin should be the only ingredient - during the day, and the cough suppressant (ex. Robitussin DM or Tessalon) if needed at night only.   Take Zinc 25 mg every 12 hours for the next week (the dose is important so do not just take a multivitamin with zinc or an over-the-counter cold med with zinc such as Airborne or Zicam, as that will not give you the sufficient dose).    You should also take Vitamin D3 5000 i.u.s per day for the next 1 week, and Vitamin-C 1 g twice daily (and again dosages are important, do not think you are getting enough vitamin C just by drinking extra orange juice).  You may use Flonase as discussed.  You may also take a decongestant like Sudafed, unless you have hypertension or cardiac disease. Avoid nasal sprays like Afrin or other vasoconstrictors.  If you are diabetic you should adhere strictly to your diabetic diet and monitor blood sugar closely while on prednisone and you should discontinue the prednisone if blood sugar becomes significantly elevated.  Avoid nonsteroidal anti-inflammatories like Advil or Aleve while on prednisone.     Upper Respiratory Infection   AMBULATORY CARE:   An upper respiratory infection  is also called a cold. Your nose, throat, ears, and sinuses may be affected. You are more likely to get a cold in the winter. Your risk of getting a cold may be increased if you smoke cigarettes or have allergies, such as hay fever.  What causes a cold?  A cold is caused by  a virus. Many viruses can cause a cold, and each is contagious. This means the virus can be easily spread to another person when the sick person coughs or sneezes. The virus can also be spread if you touch an object the virus is on and then touch your eyes, mouth, or nose.  Cold symptoms  are usually worst for the first 3 to 5 days. You may have any of the following:  Runny or stuffy nose    Sneezing and coughing    Sore throat or hoarseness    Red, watery, and sore eyes    Fatigue (you feel more tired than usual)    Chills and fever    Headache, body aches, or sore muscles    Call your local emergency number (911 in the [place]) if:   You have chest pain or trouble breathing.      Seek care immediately if:   You have a fever over 102ºF (39ºC).      Call your doctor if:   You have a low fever.    Your sore throat gets worse or you see white or yellow spots in your throat.    Your symptoms get worse after 3 to 5 days or are not better in 14 days.    You have a rash anywhere on your skin.    You have large, tender lumps in your neck.    You have thick, green, or yellow drainage from your nose.    You cough up thick yellow, green, or bloody mucus.    You have a bad earache.    You have questions or concerns about your condition or care.    Treatment:  Colds are caused by viruses and do not get better with antibiotics. Most people get better in 7 to 14 days. You may continue to cough for 2 to 3 weeks. The following may help decrease your symptoms:  Decongestants  help reduce nasal congestion and help you breathe more easily. If you take decongestant pills, they may make you feel restless or not able to sleep. Do not use decongestant sprays for more than a few days.    Cough suppressants  help reduce coughing. Ask your healthcare provider which type of cough medicine is best for you.     NSAIDs , such as ibuprofen, help decrease swelling, pain, and fever. NSAIDs can cause stomach bleeding or kidney problems in certain people.  If you take blood thinner medicine, always ask your healthcare provider if NSAIDs are safe for you. Always read the medicine label and follow directions.    Acetaminophen  decreases pain and fever. It is available without a doctor's order. Ask how much to take and how often to take it. Follow directions. Read the labels of all other medicines you are using to see if they also contain acetaminophen, or ask your doctor or pharmacist. Acetaminophen can cause liver damage if not taken correctly. Do not use more than 4 grams (4,000 milligrams) total of acetaminophen in one day.    Manage a cold:   Rest as much as possible.  Slowly start to do more each day.    Drink more liquids as directed.  Liquids will help thin and loosen mucus so you can cough it up. Liquids will also help prevent dehydration. Liquids that help prevent dehydration include water, fruit juice, and broth. Do not drink liquids that contain caffeine. Caffeine can increase your risk for dehydration. Ask your healthcare provider how much liquid to drink each day.    Soothe a sore throat.  Gargle with warm salt water. Make salt water by dissolving ¼ teaspoon salt in 1 cup warm water. You may also suck on hard candy or throat lozenges. You may use a sore throat spray.    Use a humidifier or vaporizer.  Use a cool mist humidifier or a vaporizer to increase air moisture in your home. This may make it easier for you to breathe and help decrease your cough.    Use saline nasal drops as directed.  These help relieve congestion.    Apply petroleum-based jelly around the outside of your nostrils.  This can decrease irritation from blowing your nose.    Do not smoke.  Nicotine and other chemicals in cigarettes and cigars can make your symptoms worse. They can also cause infections such as bronchitis or pneumonia. Ask your healthcare provider for information if you currently smoke and need help to quit. E-cigarettes or smokeless tobacco still contain nicotine. Talk  to your healthcare provider before you use these products.    Prevent a cold:   Wash your hands often.  Use soap and water every time you wash your hands. Rub your soapy hands together, lacing your fingers. Use the fingers of one hand to scrub under the nails of the other hand. Wash for at least 20 seconds. Rinse with warm, running water for several seconds. Then dry your hands. Use germ-killing gel if soap and water are not available. Do not touch your eyes or mouth without washing your hands first.     Cover a sneeze or cough.  Use a tissue that covers your mouth and nose. Put the used tissue in the trash right away. Use the bend of your arm if a tissue is not available. Wash your hands well with soap and water or use a hand . Do not stand close to anyone who is sneezing or coughing.    Try to stay away from others while you are sick.  This is especially important during the first 2 to 3 days when the virus is more easily spread. Wait until a fever, cough, or other symptoms are gone before you return to work or other regular activities.    Do not share items while you are sick.  This includes food, drinks, eating utensils, and dishes.    Follow up with your doctor as directed:  Write down your questions so you remember to ask them during your visits.  © Copyright [employer] 2022 Information is for End User's use only and may not be sold, redistributed or otherwise used for commercial purposes. All illustrations and images included in CareNotes® are the copyrighted property of [employer]. or [employer]  The above information is an  only. It is not intended as medical advice for individual conditions or treatments. Talk to your doctor, nurse or pharmacist before following any medical regimen to see if it is safe and effective for you.

## 2024-02-05 NOTE — LETTER
February 5, 2024     Patient: Tere Tello   YOB: 2000   Date of Visit: 2/5/2024       To Whom it May Concern:    Tere Tello was seen in my clinic on 2/5/2024. She may return to work on 02/06/2024 .    If you have any questions or concerns, please don't hesitate to call.         Sincerely,          Jose Joseph MD        CC: No Recipients

## 2024-04-29 ENCOUNTER — OFFICE VISIT (OUTPATIENT)
Age: 24
End: 2024-04-29
Payer: COMMERCIAL

## 2024-04-29 VITALS
SYSTOLIC BLOOD PRESSURE: 126 MMHG | OXYGEN SATURATION: 98 % | RESPIRATION RATE: 18 BRPM | WEIGHT: 264.77 LBS | DIASTOLIC BLOOD PRESSURE: 68 MMHG | HEIGHT: 66 IN | TEMPERATURE: 98.8 F | BODY MASS INDEX: 42.55 KG/M2 | HEART RATE: 112 BPM

## 2024-04-29 DIAGNOSIS — L03.116 CELLULITIS OF LEFT LOWER LEG: Primary | ICD-10-CM

## 2024-04-29 DIAGNOSIS — L03.116 CELLULITIS OF LEFT THIGH: ICD-10-CM

## 2024-04-29 PROCEDURE — 99213 OFFICE O/P EST LOW 20 MIN: CPT | Performed by: EMERGENCY MEDICINE

## 2024-04-29 RX ORDER — SULFAMETHOXAZOLE AND TRIMETHOPRIM 800; 160 MG/1; MG/1
1 TABLET ORAL EVERY 12 HOURS SCHEDULED
Qty: 14 TABLET | Refills: 0 | Status: SHIPPED | OUTPATIENT
Start: 2024-04-29 | End: 2024-05-06

## 2024-04-29 NOTE — PROGRESS NOTES
[facility]        NAME: Tere Tello is a 23 y.o. female  : 2000    MRN: [id number]  DATE: 2024  TIME: 12:12 PM    Assessment and Plan   Cellulitis of left lower leg [L03.116]  1. Cellulitis of left lower leg  sulfamethoxazole-trimethoprim (BACTRIM DS) 800-160 mg per tablet      2. Cellulitis of left thigh  sulfamethoxazole-trimethoprim (BACTRIM DS) 800-160 mg per tablet      Margins of both areas of erythema marked by me with skin marker.  Patient advised to go to ER if condition worsens or new symptoms develop.      Patient Instructions     Patient Instructions   Cellulitis   AMBULATORY CARE:   Cellulitis  is a skin infection caused by bacteria. Cellulitis is common and can become severe. Cellulitis usually appears on the lower legs. It can also appear on the arms, face, and other areas. Cellulitis develops when bacteria enter a crack or break in your skin, such as a scratch, bite, or cut.       Common signs and symptoms:  Signs and symptoms usually appear on one side of your body. You may have any of the following:  A fever    A red, warm, swollen area on your skin    Pain when the area is touched    Red spots, bumps, or blisters    Bumpy, raised skin that feels like an orange peel    Seek care immediately if:   Your wound gets larger and more painful.    You feel a crackling under your skin when you touch it.    You have purple dots or bumps on your skin, or you see bleeding under your skin.    You see red streaks coming from the infected area.    Call your doctor if:   The red, warm, swollen area gets larger.    Your fever or pain does not go away or gets worse.    The area does not get smaller after 3 days of antibiotics.    You have questions or concerns about your condition or care.    Treatment:  You should start to see improvement in 3 days. If your cellulitis is severe, you may need IV antibiotics in the hospital. If cellulitis is not treated, the infection can spread through  your body and become life-threatening. You may need any of the following medicines:  Antibiotics  help treat a bacterial infection.    Acetaminophen  decreases pain and fever. It is available without a doctor's order. Ask how much to take and how often to take it. Follow directions. Read the labels of all other medicines you are using to see if they also contain acetaminophen, or ask your doctor or pharmacist. Acetaminophen can cause liver damage if not taken correctly.    NSAIDs , such as ibuprofen, help decrease swelling, pain, and fever. This medicine is available with or without a doctor's order. NSAIDs can cause stomach bleeding or kidney problems in certain people. If you take blood thinner medicine, always ask your healthcare provider if NSAIDs are safe for you. Always read the medicine label and follow directions.    Take your medicine as directed.  Contact your healthcare provider if you think your medicine is not helping or if you have side effects. Tell your provider if you are allergic to any medicine. Keep a list of the medicines, vitamins, and herbs you take. Include the amounts, and when and why you take them. Bring the list or the pill bottles to follow-up visits. Carry your medicine list with you in case of an emergency.    Self-care:   Wash the area with soap and water every day.  Gently pat dry. Use bandages if directed by your healthcare provider.    Apply cream or ointment as directed.  These help protect the area. Most over-the-counter products, such as petroleum jelly, are good to use. Ask your healthcare provider about specific creams or ointments you should use.    Place a cool, damp cloth on the area.  Use clean cloths and clean water. You can do this as often as you need to. Cool, damp cloths may help decrease pain.    Elevate the area above the level of your heart  as often as you can. This will help decrease swelling and pain. Prop the area on pillows or blankets to keep it elevated  comfortably.       Prevent cellulitis:   Do not scratch bug bites or areas of injury.  You increase your risk for cellulitis by scratching these areas.    Do not share personal items, such as towels, clothing, and razors.    Clean exercise equipment  with germ-killing  before and after you use it.    Treat athlete's foot.  This can help prevent the spread of a bacterial skin infection.    Wash your hands often.  Use soap and water. Wash your hands after you use the bathroom, change a child's diapers, or sneeze. Wash your hands before you prepare or eat food. Use lotion to prevent dry, cracked skin.       Follow up with your doctor within 3 days, or as directed:  He or she will check if your cellulitis is getting better. Write down your questions so you remember to ask them during your visits.  © Copyright [employer] 2023 Information is for End User's use only and may not be sold, redistributed or otherwise used for commercial purposes.  The above information is an  only. It is not intended as medical advice for individual conditions or treatments. Talk to your doctor, nurse or pharmacist before following any medical regimen to see if it is safe and effective for you.      Follow up with PCP in 3-5 days.  Proceed to  ER if symptoms worsen.    Chief Complaint     Chief Complaint   Patient presents with    Leg Swelling     Left Leg swelling started yesterday. Red spot up thigh and shin. Painful and warm compared to other leg. Did not hit that leg.          History of Present Illness       Patient complains of 2 areas of skin redness for the past 2 days, 1 area is left upper thigh other area is left pretibial surface.  Patient recalls shaving her legs 2 days prior to onset of redness.  She admits to fevers and chills on and off for the past day.  He notes that areas of redness have become tender to the touch.        Review of Systems   Review of Systems   Constitutional:  Negative for chills and  fever.   Respiratory:  Negative for shortness of breath.    Musculoskeletal:  Negative for arthralgias, joint swelling, myalgias, neck pain and neck stiffness.   Skin:  Positive for color change and rash. Negative for wound.   Neurological:  Negative for dizziness and headaches.         Current Medications       Current Outpatient Medications:     etonogestrel (NEXPLANON) subdermal implant, Inject under the skin, Disp: , Rfl:     loratadine (CLARITIN) 10 mg tablet, Take 10 mg by mouth daily, Disp: , Rfl:     sulfamethoxazole-trimethoprim (BACTRIM DS) 800-160 mg per tablet, Take 1 tablet by mouth every 12 (twelve) hours for 7 days, Disp: 14 tablet, Rfl: 0    benzonatate (TESSALON) 200 MG capsule, Take 1 capsule (200 mg total) by mouth daily at bedtime as needed for cough (Patient not taking: Reported on 2/5/2024), Disp: 12 capsule, Rfl: 0    benzonatate (TESSALON) 200 MG capsule, Take 1 capsule (200 mg total) by mouth daily at bedtime as needed for cough (Patient not taking: Reported on 4/29/2024), Disp: 12 capsule, Rfl: 0    polymyxin b-trimethoprim (POLYTRIM) ophthalmic solution, Administer 1 drop to the right eye every 4 (four) hours (Patient not taking: Reported on 12/22/2023), Disp: 10 mL, Rfl: 0    predniSONE 10 mg tablet, Take once daily all days pills on this schedule 6- 6- 5- 4- 3- 2- 1 (Patient not taking: Reported on 2/5/2024), Disp: 27 tablet, Rfl: 0    predniSONE 10 mg tablet, Take once daily all days pills on this schedule 6- 6- 5- 4- 3- 2- 1 (Patient not taking: Reported on 4/29/2024), Disp: 27 tablet, Rfl: 0    Current Allergies     Allergies as of 04/29/2024 - Reviewed 04/29/2024   Allergen Reaction Noted    Cat hair extract Hives 01/25/2023            The following portions of the patient's history were reviewed and updated as appropriate: allergies, current medications, past family history, past medical history, past social history, past surgical history and problem list.     History reviewed. No  "pertinent past medical history.    History reviewed. No pertinent surgical history.    Family History   Problem Relation Age of Onset    No Known Problems Mother     No Known Problems Father          Medications have been verified.        Objective   /68   Pulse (!) 112   Temp 98.8 °F (37.1 °C)   Resp 18   Ht 5' 6\" (1.676 m)   Wt 120 kg (264 lb 12.4 oz)   LMP 04/01/2024   SpO2 98%   BMI 42.74 kg/m²        Physical Exam     Physical Exam  Vitals and nursing note reviewed.   Constitutional:       General: She is not in acute distress.     Appearance: Normal appearance. She is well-developed. She is not ill-appearing.   HENT:      Head: Normocephalic and atraumatic.      Right Ear: Tympanic membrane normal.      Left Ear: Tympanic membrane normal.      Nose: Mucosal edema present.      Mouth/Throat:      Pharynx: No oropharyngeal exudate or posterior oropharyngeal erythema.      Tonsils: No tonsillar abscesses.   Musculoskeletal:      Cervical back: Neck supple.   Skin:     General: Skin is warm and dry.      Findings: Erythema and rash present.      Comments: 2 areas of erythema with increased warmth on her left leg 1 area is left pretibial surface other area is left upper anterior thigh.  Margins of, both areas of erythema marked with skin marker by me today.   Neurological:      Mental Status: She is alert and oriented to person, place, and time.   Psychiatric:         Mood and Affect: Mood normal.         Behavior: Behavior normal.         Thought Content: Thought content normal.         Judgment: Judgment normal.                   "

## 2024-05-13 ENCOUNTER — OFFICE VISIT (OUTPATIENT)
Age: 24
End: 2024-05-13
Payer: COMMERCIAL

## 2024-05-13 VITALS
RESPIRATION RATE: 18 BRPM | HEIGHT: 66 IN | HEART RATE: 88 BPM | DIASTOLIC BLOOD PRESSURE: 80 MMHG | SYSTOLIC BLOOD PRESSURE: 118 MMHG | OXYGEN SATURATION: 99 % | TEMPERATURE: 97.6 F | BODY MASS INDEX: 46.31 KG/M2 | WEIGHT: 288.14 LBS

## 2024-05-13 DIAGNOSIS — J06.9 VIRAL URI: Primary | ICD-10-CM

## 2024-05-13 PROCEDURE — 99213 OFFICE O/P EST LOW 20 MIN: CPT | Performed by: EMERGENCY MEDICINE

## 2024-05-13 RX ORDER — PREDNISONE 10 MG/1
TABLET ORAL
Qty: 27 TABLET | Refills: 0 | Status: SHIPPED | OUTPATIENT
Start: 2024-05-13

## 2024-05-13 NOTE — PATIENT INSTRUCTIONS
diagnosed with a Viral Upper Respiratory infection and your symptoms should resolve over the next 7 to 10 days with the treatments recommended today.  If they do not, it is possible that you have developed a bacterial infection and you should return. If you were to take an antibiotic while you are still in the viral stage, you will not get better any faster, but could kill off many of the good germs in your body as well as make the germs in you resistant to the antibiotic.  Take an expectorant - guaifenesin should be the only ingredient - during the day, and the cough suppressant (ex. Robitussin DM or Tessalon) if needed at night only.   Take Zinc 25 mg every 12 hours for the next week (the dose is important so do not just take a multivitamin with zinc or an over-the-counter cold med with zinc such as Airborne or Zicam, as that will not give you the sufficient dose).    You should also take Vitamin D3 5000 i.u.s per day for the next 1 week, and Vitamin-C 1 g twice daily (and again dosages are important, do not think you are getting enough vitamin C just by drinking extra orange juice).  You may use Flonase as discussed.  You may also take a decongestant like Sudafed, unless you have hypertension or cardiac disease. Avoid nasal sprays like Afrin or other vasoconstrictors.  If you are diabetic you should adhere strictly to your diabetic diet and monitor blood sugar closely while on prednisone and you should discontinue the prednisone if blood sugar becomes significantly elevated.  Avoid nonsteroidal anti-inflammatories like Advil or Aleve while on prednisone.   Although bothersome, mucous is not necessarily a bad thing. Production of mucous is the body's way of trying to capture and flush irritants from mucosal surfaces. Yellow or green mucous does not necessarily mean you have a bacterial infection. Mucous will become more discolored over time, especially first thing in the morning, as your body's immune system  floods the mucosal surfaces with white bloods cells to try and help fight infection. This white blood cell debride can also cause mucous to be discolored. Again, using nasal saline spray frequently may help soothe and keep mucous flowing out versus getting dried, thickened and / or stuck leading to more sinus pain and pressure.      If you have a cough, please realize that a cough is not necessarily a bad thing. It is a part of your body's protection mechanism to help keep your airways clear. Phlegm may be more discolored in the morning. Please note that discolored phlegm does not necessarily mean a bacterial infection.        Upper Respiratory Infection   AMBULATORY CARE:   An upper respiratory infection  is also called a cold. Your nose, throat, ears, and sinuses may be affected. You are more likely to get a cold in the winter. Your risk of getting a cold may be increased if you smoke cigarettes or have allergies, such as hay fever.  What causes a cold?  A cold is caused by a virus. Many viruses can cause a cold, and each is contagious. This means the virus can be easily spread to another person when the sick person coughs or sneezes. The virus can also be spread if you touch an object the virus is on and then touch your eyes, mouth, or nose.  Cold symptoms  are usually worst for the first 3 to 5 days. You may have any of the following:  Runny or stuffy nose    Sneezing and coughing    Sore throat or hoarseness    Red, watery, and sore eyes    Fatigue (you feel more tired than usual)    Chills and fever    Headache, body aches, or sore muscles    Call your local emergency number (911 in the [place]) if:   You have chest pain or trouble breathing.      Seek care immediately if:   You have a fever over 102ºF (39ºC).      Call your doctor if:   You have a low fever.    Your sore throat gets worse or you see white or yellow spots in your throat.    Your symptoms get worse after 3 to 5 days or are not better in 14  days.    You have a rash anywhere on your skin.    You have large, tender lumps in your neck.    You have thick, green, or yellow drainage from your nose.    You cough up thick yellow, green, or bloody mucus.    You have a bad earache.    You have questions or concerns about your condition or care.    Treatment:  Colds are caused by viruses and do not get better with antibiotics. Most people get better in 7 to 14 days. You may continue to cough for 2 to 3 weeks. The following may help decrease your symptoms:  Decongestants  help reduce nasal congestion and help you breathe more easily. If you take decongestant pills, they may make you feel restless or not able to sleep. Do not use decongestant sprays for more than a few days.    Cough suppressants  help reduce coughing. Ask your healthcare provider which type of cough medicine is best for you.     NSAIDs , such as ibuprofen, help decrease swelling, pain, and fever. NSAIDs can cause stomach bleeding or kidney problems in certain people. If you take blood thinner medicine, always ask your healthcare provider if NSAIDs are safe for you. Always read the medicine label and follow directions.    Acetaminophen  decreases pain and fever. It is available without a doctor's order. Ask how much to take and how often to take it. Follow directions. Read the labels of all other medicines you are using to see if they also contain acetaminophen, or ask your doctor or pharmacist. Acetaminophen can cause liver damage if not taken correctly. Do not use more than 4 grams (4,000 milligrams) total of acetaminophen in one day.    Manage a cold:   Rest as much as possible.  Slowly start to do more each day.    Drink more liquids as directed.  Liquids will help thin and loosen mucus so you can cough it up. Liquids will also help prevent dehydration. Liquids that help prevent dehydration include water, fruit juice, and broth. Do not drink liquids that contain caffeine. Caffeine can increase  your risk for dehydration. Ask your healthcare provider how much liquid to drink each day.    Soothe a sore throat.  Gargle with warm salt water. Make salt water by dissolving ¼ teaspoon salt in 1 cup warm water. You may also suck on hard candy or throat lozenges. You may use a sore throat spray.    Use a humidifier or vaporizer.  Use a cool mist humidifier or a vaporizer to increase air moisture in your home. This may make it easier for you to breathe and help decrease your cough.    Use saline nasal drops as directed.  These help relieve congestion.    Apply petroleum-based jelly around the outside of your nostrils.  This can decrease irritation from blowing your nose.    Do not smoke.  Nicotine and other chemicals in cigarettes and cigars can make your symptoms worse. They can also cause infections such as bronchitis or pneumonia. Ask your healthcare provider for information if you currently smoke and need help to quit. E-cigarettes or smokeless tobacco still contain nicotine. Talk to your healthcare provider before you use these products.    Prevent a cold:   Wash your hands often.  Use soap and water every time you wash your hands. Rub your soapy hands together, lacing your fingers. Use the fingers of one hand to scrub under the nails of the other hand. Wash for at least 20 seconds. Rinse with warm, running water for several seconds. Then dry your hands. Use germ-killing gel if soap and water are not available. Do not touch your eyes or mouth without washing your hands first.     Cover a sneeze or cough.  Use a tissue that covers your mouth and nose. Put the used tissue in the trash right away. Use the bend of your arm if a tissue is not available. Wash your hands well with soap and water or use a hand . Do not stand close to anyone who is sneezing or coughing.    Try to stay away from others while you are sick.  This is especially important during the first 2 to 3 days when the virus is more easily  spread. Wait until a fever, cough, or other symptoms are gone before you return to work or other regular activities.    Do not share items while you are sick.  This includes food, drinks, eating utensils, and dishes.    Follow up with your doctor as directed:  Write down your questions so you remember to ask them during your visits.  © Copyright [employer] 2022 Information is for End User's use only and may not be sold, redistributed or otherwise used for commercial purposes. All illustrations and images included in CareNotes® are the copyrighted property of [employer] or [employer]  The above information is an  only. It is not intended as medical advice for individual conditions or treatments. Talk to your doctor, nurse or pharmacist before following any medical regimen to see if it is safe and effective for you.

## 2024-05-13 NOTE — PROGRESS NOTES
[facility]        NAME: Tere Tello is a 23 y.o. female  : 2000    MRN: [id number]  DATE: May 13, 2024  TIME: 9:18 AM    Assessment and Plan   Viral URI [J06.9]  1. Viral URI  predniSONE 10 mg tablet            Patient Instructions     Patient Instructions   diagnosed with a Viral Upper Respiratory infection and your symptoms should resolve over the next 7 to 10 days with the treatments recommended today.  If they do not, it is possible that you have developed a bacterial infection and you should return. If you were to take an antibiotic while you are still in the viral stage, you will not get better any faster, but could kill off many of the good germs in your body as well as make the germs in you resistant to the antibiotic.  Take an expectorant - guaifenesin should be the only ingredient - during the day, and the cough suppressant (ex. Robitussin DM or Tessalon) if needed at night only.   Take Zinc 25 mg every 12 hours for the next week (the dose is important so do not just take a multivitamin with zinc or an over-the-counter cold med with zinc such as Airborne or Zicam, as that will not give you the sufficient dose).    You should also take Vitamin D3 5000 i.u.s per day for the next 1 week, and Vitamin-C 1 g twice daily (and again dosages are important, do not think you are getting enough vitamin C just by drinking extra orange juice).  You may use Flonase as discussed.  You may also take a decongestant like Sudafed, unless you have hypertension or cardiac disease. Avoid nasal sprays like Afrin or other vasoconstrictors.  If you are diabetic you should adhere strictly to your diabetic diet and monitor blood sugar closely while on prednisone and you should discontinue the prednisone if blood sugar becomes significantly elevated.  Avoid nonsteroidal anti-inflammatories like Advil or Aleve while on prednisone.   Although bothersome, mucous is not necessarily a bad thing. Production of mucous is  the body's way of trying to capture and flush irritants from mucosal surfaces. Yellow or green mucous does not necessarily mean you have a bacterial infection. Mucous will become more discolored over time, especially first thing in the morning, as your body's immune system floods the mucosal surfaces with white bloods cells to try and help fight infection. This white blood cell debride can also cause mucous to be discolored. Again, using nasal saline spray frequently may help soothe and keep mucous flowing out versus getting dried, thickened and / or stuck leading to more sinus pain and pressure.      If you have a cough, please realize that a cough is not necessarily a bad thing. It is a part of your body's protection mechanism to help keep your airways clear. Phlegm may be more discolored in the morning. Please note that discolored phlegm does not necessarily mean a bacterial infection.        Upper Respiratory Infection   AMBULATORY CARE:   An upper respiratory infection  is also called a cold. Your nose, throat, ears, and sinuses may be affected. You are more likely to get a cold in the winter. Your risk of getting a cold may be increased if you smoke cigarettes or have allergies, such as hay fever.  What causes a cold?  A cold is caused by a virus. Many viruses can cause a cold, and each is contagious. This means the virus can be easily spread to another person when the sick person coughs or sneezes. The virus can also be spread if you touch an object the virus is on and then touch your eyes, mouth, or nose.  Cold symptoms  are usually worst for the first 3 to 5 days. You may have any of the following:  Runny or stuffy nose    Sneezing and coughing    Sore throat or hoarseness    Red, watery, and sore eyes    Fatigue (you feel more tired than usual)    Chills and fever    Headache, body aches, or sore muscles    Call your local emergency number (911 in the [place]) if:   You have chest pain or trouble  breathing.      Seek care immediately if:   You have a fever over 102ºF (39ºC).      Call your doctor if:   You have a low fever.    Your sore throat gets worse or you see white or yellow spots in your throat.    Your symptoms get worse after 3 to 5 days or are not better in 14 days.    You have a rash anywhere on your skin.    You have large, tender lumps in your neck.    You have thick, green, or yellow drainage from your nose.    You cough up thick yellow, green, or bloody mucus.    You have a bad earache.    You have questions or concerns about your condition or care.    Treatment:  Colds are caused by viruses and do not get better with antibiotics. Most people get better in 7 to 14 days. You may continue to cough for 2 to 3 weeks. The following may help decrease your symptoms:  Decongestants  help reduce nasal congestion and help you breathe more easily. If you take decongestant pills, they may make you feel restless or not able to sleep. Do not use decongestant sprays for more than a few days.    Cough suppressants  help reduce coughing. Ask your healthcare provider which type of cough medicine is best for you.     NSAIDs , such as ibuprofen, help decrease swelling, pain, and fever. NSAIDs can cause stomach bleeding or kidney problems in certain people. If you take blood thinner medicine, always ask your healthcare provider if NSAIDs are safe for you. Always read the medicine label and follow directions.    Acetaminophen  decreases pain and fever. It is available without a doctor's order. Ask how much to take and how often to take it. Follow directions. Read the labels of all other medicines you are using to see if they also contain acetaminophen, or ask your doctor or pharmacist. Acetaminophen can cause liver damage if not taken correctly. Do not use more than 4 grams (4,000 milligrams) total of acetaminophen in one day.    Manage a cold:   Rest as much as possible.  Slowly start to do more each day.    Drink  more liquids as directed.  Liquids will help thin and loosen mucus so you can cough it up. Liquids will also help prevent dehydration. Liquids that help prevent dehydration include water, fruit juice, and broth. Do not drink liquids that contain caffeine. Caffeine can increase your risk for dehydration. Ask your healthcare provider how much liquid to drink each day.    Soothe a sore throat.  Gargle with warm salt water. Make salt water by dissolving ¼ teaspoon salt in 1 cup warm water. You may also suck on hard candy or throat lozenges. You may use a sore throat spray.    Use a humidifier or vaporizer.  Use a cool mist humidifier or a vaporizer to increase air moisture in your home. This may make it easier for you to breathe and help decrease your cough.    Use saline nasal drops as directed.  These help relieve congestion.    Apply petroleum-based jelly around the outside of your nostrils.  This can decrease irritation from blowing your nose.    Do not smoke.  Nicotine and other chemicals in cigarettes and cigars can make your symptoms worse. They can also cause infections such as bronchitis or pneumonia. Ask your healthcare provider for information if you currently smoke and need help to quit. E-cigarettes or smokeless tobacco still contain nicotine. Talk to your healthcare provider before you use these products.    Prevent a cold:   Wash your hands often.  Use soap and water every time you wash your hands. Rub your soapy hands together, lacing your fingers. Use the fingers of one hand to scrub under the nails of the other hand. Wash for at least 20 seconds. Rinse with warm, running water for several seconds. Then dry your hands. Use germ-killing gel if soap and water are not available. Do not touch your eyes or mouth without washing your hands first.     Cover a sneeze or cough.  Use a tissue that covers your mouth and nose. Put the used tissue in the trash right away. Use the bend of your arm if a tissue is not  available. Wash your hands well with soap and water or use a hand . Do not stand close to anyone who is sneezing or coughing.    Try to stay away from others while you are sick.  This is especially important during the first 2 to 3 days when the virus is more easily spread. Wait until a fever, cough, or other symptoms are gone before you return to work or other regular activities.    Do not share items while you are sick.  This includes food, drinks, eating utensils, and dishes.    Follow up with your doctor as directed:  Write down your questions so you remember to ask them during your visits.  © Copyright [employer] [facility] 2022 Information is for End User's use only and may not be sold, redistributed or otherwise used for commercial purposes. All illustrations and images included in CareNotes® are the copyrighted property of [employer], [employer] or [employer]  The above information is an  only. It is not intended as medical advice for individual conditions or treatments. Talk to your doctor, nurse or pharmacist before following any medical regimen to see if it is safe and effective for you.          Follow up with PCP in 3-5 days.  Proceed to  ER if symptoms worsen.    Chief Complaint     Chief Complaint   Patient presents with    Cough     Cough, post nasal drip, sore throat, nasal congestion started Tuesday. Took mucinex last night which helped and taking ibuprofen.          History of Present Illness       Patient complains of sore throat, cough, congestion for the past 6 days.        Review of Systems   Review of Systems   Constitutional:  Negative for appetite change, chills, fatigue and fever.   HENT:  Positive for congestion, sinus pressure and sore throat. Negative for rhinorrhea, trouble swallowing and voice change.    Respiratory:  Positive for cough. Negative for chest tightness, shortness of breath and wheezing.    Cardiovascular:  Negative for chest pain.   Gastrointestinal:   Negative for nausea.   Musculoskeletal:  Negative for myalgias.         Current Medications       Current Outpatient Medications:     etonogestrel (NEXPLANON) subdermal implant, Inject under the skin, Disp: , Rfl:     loratadine (CLARITIN) 10 mg tablet, Take 10 mg by mouth daily, Disp: , Rfl:     predniSONE 10 mg tablet, Take once dailyall days pills on this schedule 6- 6- 5- 4- 3- 2- 1, Disp: 27 tablet, Rfl: 0    benzonatate (TESSALON) 200 MG capsule, Take 1 capsule (200 mg total) by mouth daily at bedtime as needed for cough (Patient not taking: Reported on 2/5/2024), Disp: 12 capsule, Rfl: 0    benzonatate (TESSALON) 200 MG capsule, Take 1 capsule (200 mg total) by mouth daily at bedtime as needed for cough (Patient not taking: Reported on 4/29/2024), Disp: 12 capsule, Rfl: 0    polymyxin b-trimethoprim (POLYTRIM) ophthalmic solution, Administer 1 drop to the right eye every 4 (four) hours (Patient not taking: Reported on 12/22/2023), Disp: 10 mL, Rfl: 0    predniSONE 10 mg tablet, Take once daily all days pills on this schedule 6- 6- 5- 4- 3- 2- 1 (Patient not taking: Reported on 2/5/2024), Disp: 27 tablet, Rfl: 0    predniSONE 10 mg tablet, Take once daily all days pills on this schedule 6- 6- 5- 4- 3- 2- 1 (Patient not taking: Reported on 4/29/2024), Disp: 27 tablet, Rfl: 0    Current Allergies     Allergies as of 05/13/2024 - Reviewed 05/13/2024   Allergen Reaction Noted    Cat hair extract Hives 01/25/2023            The following portions of the patient's history were reviewed and updated as appropriate: allergies, current medications, past family history, past medical history, past social history, past surgical history and problem list.     History reviewed. No pertinent past medical history.    History reviewed. No pertinent surgical history.    Family History   Problem Relation Age of Onset    No Known Problems Mother     No Known Problems Father          Medications have been verified.        Objective  "  /80   Pulse 88   Temp 97.6 °F (36.4 °C)   Resp 18   Ht 5' 6\" (1.676 m)   Wt 131 kg (288 lb 2.3 oz)   LMP 05/07/2024 (Approximate)   SpO2 99%   BMI 46.51 kg/m²        Physical Exam     Physical Exam  Vitals and nursing note reviewed.   Constitutional:       General: She is not in acute distress.     Appearance: She is well-developed. She is not ill-appearing or toxic-appearing.   HENT:      Head: Normocephalic and atraumatic.      Right Ear: External ear normal.      Left Ear: External ear normal.      Nose: Mucosal edema and congestion present.      Mouth/Throat:      Pharynx: Posterior oropharyngeal erythema present. No oropharyngeal exudate.      Tonsils: No tonsillar abscesses.   Cardiovascular:      Rate and Rhythm: Normal rate and regular rhythm.   Pulmonary:      Effort: Pulmonary effort is normal. No respiratory distress.      Breath sounds: No wheezing or rales.   Musculoskeletal:      Cervical back: Neck supple.   Skin:     General: Skin is warm and dry.   Neurological:      Mental Status: She is alert and oriented to person, place, and time.   Psychiatric:         Mood and Affect: Mood normal.         Behavior: Behavior normal.         Thought Content: Thought content normal.         Judgment: Judgment normal.                   "

## 2024-08-08 ENCOUNTER — OFFICE VISIT (OUTPATIENT)
Age: 24
End: 2024-08-08
Payer: COMMERCIAL

## 2024-08-08 VITALS
WEIGHT: 292.55 LBS | DIASTOLIC BLOOD PRESSURE: 80 MMHG | OXYGEN SATURATION: 97 % | RESPIRATION RATE: 18 BRPM | BODY MASS INDEX: 47.02 KG/M2 | HEART RATE: 116 BPM | TEMPERATURE: 98.2 F | HEIGHT: 66 IN | SYSTOLIC BLOOD PRESSURE: 118 MMHG

## 2024-08-08 DIAGNOSIS — L81.9 DISCOLORATION OF SKIN OF LOWER LEG: Primary | ICD-10-CM

## 2024-08-08 PROCEDURE — 99213 OFFICE O/P EST LOW 20 MIN: CPT

## 2024-08-08 NOTE — PROGRESS NOTES
[facility]        NAME: Tere Tello is a 24 y.o. female  : 2000    MRN: [id number]  DATE: 2024  TIME: 4:36 PM    Assessment and Plan   Discoloration of skin of lower leg [L81.9]  1. Discoloration of skin of lower leg          Discussed that her current discomfort does not appear to be an infection that we can visualize and that there can be some remnant of fluid from when she did have cellulitis.  recommended at this time to try to use heat to see if that will help dissipate the lump that she feels and if not improving to follow-up with her PCP.    Patient Instructions   Elevate the left leg when possible to reduce ankle swelling.  Apply heat to the area where you feel the lump and pain - 15 minutes every 2 hours as needed to help dissipate the discomfort.     Follow up with Primary Care Provider in 3-5 days if not improving.  Proceed to Emergency Department if symptoms worsen.    If tests have been performed at [facility], our office will contact you with results if changes need to be made to the care plan discussed with you at the visit.  You can review your full results on [facility].    Chief Complaint     Chief Complaint   Patient presents with   • Mass      Lump on lower LEFT leg, pt noticed it on . States it it painful to the touch. 1/10 pain. OTC - antibiotic ointment.    • Joint Swelling     Swelling in LEFT ankle off and on since cellulitis dx .          History of Present Illness       Feels a lump on her left lower leg starting about 12 days ago states it is painful to touch but it does not cause her to alter her gait.  She states she did not hit it on anything but remember she tripped but did not cause any open wounds on her leg in the specific area.  Also has some discoloration to her shin where she had cellulitis about 4 months ago and is concerned that it does not look completely back to her normal skin color at this time.  States also she has been having  left ankle swelling on and off in the last 4 months as well.  No new injuries.        Review of Systems   Review of Systems   Constitutional:  Negative for chills and fever.   HENT:  Negative for ear pain and sore throat.    Eyes:  Negative for pain and visual disturbance.   Respiratory:  Negative for cough and shortness of breath.    Cardiovascular:  Negative for chest pain and palpitations.   Gastrointestinal:  Negative for abdominal pain and vomiting.   Genitourinary:  Negative for dysuria and hematuria.   Musculoskeletal:  Positive for joint swelling (Left ankle). Negative for arthralgias and back pain.   Skin:  Positive for color change. Negative for rash.   Neurological:  Negative for dizziness, seizures, syncope, weakness and numbness.   All other systems reviewed and are negative.        Current Medications       Current Outpatient Medications:   •  etonogestrel (NEXPLANON) subdermal implant, Inject under the skin, Disp: , Rfl:   •  loratadine (CLARITIN) 10 mg tablet, Take 10 mg by mouth daily, Disp: , Rfl:   •  benzonatate (TESSALON) 200 MG capsule, Take 1 capsule (200 mg total) by mouth daily at bedtime as needed for cough (Patient not taking: Reported on 2/5/2024), Disp: 12 capsule, Rfl: 0  •  benzonatate (TESSALON) 200 MG capsule, Take 1 capsule (200 mg total) by mouth daily at bedtime as needed for cough (Patient not taking: Reported on 4/29/2024), Disp: 12 capsule, Rfl: 0  •  polymyxin b-trimethoprim (POLYTRIM) ophthalmic solution, Administer 1 drop to the right eye every 4 (four) hours (Patient not taking: Reported on 12/22/2023), Disp: 10 mL, Rfl: 0  •  predniSONE 10 mg tablet, Take once daily all days pills on this schedule 6- 6- 5- 4- 3- 2- 1 (Patient not taking: Reported on 2/5/2024), Disp: 27 tablet, Rfl: 0  •  predniSONE 10 mg tablet, Take once daily all days pills on this schedule 6- 6- 5- 4- 3- 2- 1 (Patient not taking: Reported on 4/29/2024), Disp: 27 tablet, Rfl: 0  •  predniSONE 10 mg  "tablet, Take once dailyall days pills on this schedule 6- 6- 5- 4- 3- 2- 1 (Patient not taking: Reported on 8/8/2024), Disp: 27 tablet, Rfl: 0    Current Allergies     Allergies as of 08/08/2024 - Reviewed 08/08/2024   Allergen Reaction Noted   • Cat hair extract Hives 01/25/2023            The following portions of the patient's history were reviewed and updated as appropriate: allergies, current medications, past family history, past medical history, past social history, past surgical history and problem list.     History reviewed. No pertinent past medical history.    History reviewed. No pertinent surgical history.    Family History   Problem Relation Age of Onset   • No Known Problems Mother    • No Known Problems Father          Medications have been verified.        Objective   /80   Pulse (!) 116   Temp 98.2 °F (36.8 °C)   Resp 18   Ht 5' 6\" (1.676 m)   Wt 133 kg (292 lb 8.8 oz)   LMP 08/01/2024 (Approximate)   SpO2 97%   BMI 47.22 kg/m²   Patient's last menstrual period was 08/01/2024 (approximate).       Physical Exam     Physical Exam  Vitals and nursing note reviewed.   Constitutional:       Appearance: Normal appearance.   HENT:      Head: Normocephalic and atraumatic.   Pulmonary:      Effort: Pulmonary effort is normal.   Musculoskeletal:        Legs:    Skin:     General: Skin is warm and dry.      Capillary Refill: Capillary refill takes less than 2 seconds.   Neurological:      General: No focal deficit present.      Mental Status: She is alert and oriented to person, place, and time. Mental status is at baseline.      Sensory: No sensory deficit.      Motor: No weakness.   Psychiatric:         Mood and Affect: Mood normal.         Behavior: Behavior normal.         Thought Content: Thought content normal.                   "

## 2024-08-08 NOTE — PATIENT INSTRUCTIONS
Elevate the left leg when possible to reduce ankle swelling.  Apply heat to the area where you feel the lump and pain - 15 minutes every 2 hours as needed to help dissipate the discomfort.     Follow up with Primary Care Provider in 3-5 days if not improving.  Proceed to Emergency Department if symptoms worsen.    If tests have been performed at [facility], our office will contact you with results if changes need to be made to the care plan discussed with you at the visit.  You can review your full results on [facility] My[facility].

## 2024-10-17 ENCOUNTER — OFFICE VISIT (OUTPATIENT)
Age: 24
End: 2024-10-17
Payer: COMMERCIAL

## 2024-10-17 VITALS
SYSTOLIC BLOOD PRESSURE: 136 MMHG | RESPIRATION RATE: 18 BRPM | DIASTOLIC BLOOD PRESSURE: 84 MMHG | WEIGHT: 292.11 LBS | BODY MASS INDEX: 46.95 KG/M2 | HEART RATE: 96 BPM | TEMPERATURE: 96.9 F | OXYGEN SATURATION: 99 % | HEIGHT: 66 IN

## 2024-10-17 DIAGNOSIS — M54.12 CERVICAL RADICULOPATHY: Primary | ICD-10-CM

## 2024-10-17 PROCEDURE — 99213 OFFICE O/P EST LOW 20 MIN: CPT

## 2024-10-17 RX ORDER — PREDNISONE 10 MG/1
TABLET ORAL
Qty: 21 TABLET | Refills: 0 | Status: SHIPPED | OUTPATIENT
Start: 2024-10-17

## 2024-10-17 NOTE — PROGRESS NOTES
[facility]        NAME: Tere Tello is a 24 y.o. female  : 2000    MRN: [id number]  DATE: 2024  TIME: 10:38 AM    Assessment and Plan   Cervical radiculopathy [M54.12]  1. Cervical radiculopathy  predniSONE 10 mg tablet    Ambulatory Referral to Physical Therapy            Patient Instructions   Will trial a short course of steroids to see if that relieves the pain sensation that I suspect is from the nerve being pinched from repetitive motion of her neck.  If this does not resolve with a course of steroids I recommend physical therapy evaluation.    Follow up with Primary Care Provider in 3-5 days if not improving.  Proceed to Emergency Department if symptoms worsen.    If tests have been performed at [facility], our office will contact you with results if changes need to be made to the care plan discussed with you at the visit.  [facility]    Chief Complaint     Chief Complaint   Patient presents with    Shoulder Pain     Since yesterday. Pain in right upper arm/shoulder when turning her head to the right. 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 Gets the sensation that her fingers in right hand are cold while in this position. Icing and heating last night before bed. Denies any recent injury.          History of Present Illness       Right shoulder and upper arm pain that she noticed starting yesterday whenever she turns her head to the right.  States no specific injuries and no injuries in the past to her neck.  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  She does report that there is a coworker to her right side and she frequently turns her head to the right to talk with her coworker.    Shoulder Pain   Pertinent negatives include no fever.       Review of Systems   Review of Systems   Constitutional:  Negative for chills and fever.   Respiratory:  Negative for cough and  "shortness of breath.    Cardiovascular:  Negative for chest pain.   Musculoskeletal:  Positive for myalgias. Negative for neck pain and neck stiffness.   Neurological:  Negative for dizziness, weakness, light-headedness and headaches.         Current Medications       Current Outpatient Medications:     etonogestrel (NEXPLANON) subdermal implant, Inject under the skin, Disp: , Rfl:     loratadine (CLARITIN) 10 mg tablet, Take 10 mg by mouth daily, Disp: , Rfl:     predniSONE 10 mg tablet, There are 21 total tablets: 6 tablets on day 1, 5 tablets on day 2, 4 tablets on day 3, 3 tablets on day 4, 2 tablets on day 5, 1 tablet on day 6., Disp: 21 tablet, Rfl: 0    benzonatate (TESSALON) 200 MG capsule, Take 1 capsule (200 mg total) by mouth daily at bedtime as needed for cough (Patient not taking: Reported on 2/5/2024), Disp: 12 capsule, Rfl: 0    benzonatate (TESSALON) 200 MG capsule, Take 1 capsule (200 mg total) by mouth daily at bedtime as needed for cough (Patient not taking: Reported on 4/29/2024), Disp: 12 capsule, Rfl: 0    polymyxin b-trimethoprim (POLYTRIM) ophthalmic solution, Administer 1 drop to the right eye every 4 (four) hours (Patient not taking: Reported on 12/22/2023), Disp: 10 mL, Rfl: 0    Current Allergies     Allergies as of 10/17/2024 - Reviewed 10/17/2024   Allergen Reaction Noted    Cat hair extract Hives 01/25/2023            The following portions of the patient's history were reviewed and updated as appropriate: allergies, current medications, past family history, past medical history, past social history, past surgical history and problem list.     History reviewed. No pertinent past medical history.    History reviewed. No pertinent surgical history.    Family History   Problem Relation Age of Onset    No Known Problems Mother     No Known Problems Father          Medications have been verified.        Objective   /84   Pulse 96   Temp (!) 96.9 °F (36.1 °C)   Resp 18   Ht 5' 6\" " (1.676 m)   Wt 132 kg (292 lb 1.8 oz)   LMP 09/05/2024 (Approximate) Comment: irregular  SpO2 99%   BMI 47.15 kg/m²   Patient's last menstrual period was 09/05/2024 (approximate).       Physical Exam     Physical Exam  Vitals and nursing note reviewed.   Constitutional:       Appearance: Normal appearance.   HENT:      Head: Normocephalic and atraumatic.   Pulmonary:      Effort: Pulmonary effort is normal.   Musculoskeletal:        Arms:    Skin:     General: Skin is warm and dry.      Capillary Refill: Capillary refill takes less than 2 seconds.   Neurological:      General: No focal deficit present.      Mental Status: She is alert and oriented to person, place, and time. Mental status is at baseline.      Sensory: No sensory deficit.      Motor: No weakness.   Psychiatric:         Mood and Affect: Mood normal.         Behavior: Behavior normal.         Thought Content: Thought content normal.

## 2024-10-17 NOTE — PATIENT INSTRUCTIONS
Will trial a short course of steroids to see if that relieves the pain sensation that I suspect is from the nerve being pinched from repetitive motion of her neck.  If this does not resolve with a course of steroids I recommend physical therapy evaluation.    Follow up with Primary Care Provider in 3-5 days if not improving.  Proceed to Emergency Department if symptoms worsen.    If tests have been performed at [facility], our office will contact you with results if changes need to be made to the care plan discussed with you at the visit.  You can review your full results on [facility] MyChart.